# Patient Record
Sex: MALE | Race: WHITE | Employment: FULL TIME | URBAN - NONMETROPOLITAN AREA
[De-identification: names, ages, dates, MRNs, and addresses within clinical notes are randomized per-mention and may not be internally consistent; named-entity substitution may affect disease eponyms.]

---

## 2024-06-13 ENCOUNTER — OFFICE VISIT (OUTPATIENT)
Dept: FAMILY MEDICINE CLINIC | Age: 64
End: 2024-06-13

## 2024-06-13 VITALS
WEIGHT: 291 LBS | HEART RATE: 65 BPM | RESPIRATION RATE: 16 BRPM | TEMPERATURE: 97.2 F | DIASTOLIC BLOOD PRESSURE: 80 MMHG | OXYGEN SATURATION: 95 % | SYSTOLIC BLOOD PRESSURE: 130 MMHG

## 2024-06-13 DIAGNOSIS — S61.210A LACERATION OF RIGHT INDEX FINGER WITHOUT FOREIGN BODY WITHOUT DAMAGE TO NAIL, INITIAL ENCOUNTER: Primary | ICD-10-CM

## 2024-06-13 RX ORDER — HYDRALAZINE HYDROCHLORIDE 25 MG/1
1 TABLET, FILM COATED ORAL 2 TIMES DAILY
COMMUNITY
Start: 2024-05-14

## 2024-06-13 RX ORDER — POTASSIUM CITRATE 15 MEQ/1
15 TABLET, EXTENDED RELEASE ORAL
COMMUNITY
Start: 2023-02-06

## 2024-06-13 RX ORDER — GLYBURIDE 5 MG/1
1 TABLET ORAL 2 TIMES DAILY
COMMUNITY
Start: 2024-03-08

## 2024-06-13 RX ORDER — LOSARTAN POTASSIUM 50 MG/1
50 TABLET ORAL DAILY
COMMUNITY
Start: 2024-04-29

## 2024-06-13 RX ORDER — ATENOLOL 50 MG/1
50 TABLET ORAL DAILY
COMMUNITY
Start: 2024-04-19

## 2024-06-13 RX ORDER — LIDOCAINE 50 MG/G
1 PATCH TOPICAL DAILY
COMMUNITY
Start: 2023-12-01

## 2024-06-13 RX ORDER — LANCETS 30 GAUGE
EACH MISCELLANEOUS
COMMUNITY
Start: 2024-03-26

## 2024-06-13 RX ORDER — ALLOPURINOL 300 MG/1
300 TABLET ORAL DAILY
COMMUNITY
Start: 2023-05-08

## 2024-06-13 RX ORDER — TIRZEPATIDE 2.5 MG/.5ML
2.5 INJECTION, SOLUTION SUBCUTANEOUS
COMMUNITY
Start: 2024-03-25

## 2024-06-13 RX ORDER — HYDROCHLOROTHIAZIDE 12.5 MG/1
12.5 CAPSULE, GELATIN COATED ORAL DAILY
COMMUNITY
Start: 2024-04-22

## 2024-06-13 RX ORDER — ALLOPURINOL 100 MG/1
100 TABLET ORAL DAILY
COMMUNITY
Start: 2024-04-22

## 2024-06-13 RX ORDER — BACLOFEN 10 MG/1
10 TABLET ORAL EVERY 8 HOURS PRN
COMMUNITY
Start: 2023-12-01

## 2024-06-13 NOTE — PROGRESS NOTES
Subjective:      Madan Cabezas is a 63 y.o. male who presents for evaluation of a laceration to the  right index finger . Injury occurred 2 hour ago. The mechanism of the wound was razor. The patient reports no coldness, no numbness, no paresthesias in the affected area. There was not other injuries.  Patient denies head injury, loss of consciousness, neck pain, chest pain, abdominal pain, extremity injury, numbness, and weakness. The tetanus status is a year ago secondary to leg injury.  Patient's medications, allergies, past medical, surgical, social and family histories were reviewed and updated as appropriate.    Review of Systems  Pertinent items are noted in HPI.      Objective:      /80 (Site: Left Upper Arm, Position: Sitting, Cuff Size: Large Adult)   Pulse 65   Temp 97.2 °F (36.2 °C) (Temporal)   Resp 16   Wt 132 kg (291 lb)   SpO2 95%     There is a v-shaped laceration measuring approximately 1.5 cm in length on the right medial index finger. Examination of the wound for foreign bodies and devitalized tissue showed none.  Examination of the surrounding area for neural or vascular damage showed no concerns.  Cap refill intact, neurovascularly intact.      Consent was obtained prior to laceration repair.     Assessment:      1.5 cm  right index finger  laceration      Plan:      The wound area was wiped with isopropyl alcohol and draped in a sterile fashion.  The wound area was anesthetized with Lidocaine 1% without epinephrine without added sodium bicarbonate.  The wound was explored with the following results No foreign bodies found.  The wound was repaired with 3-0 ethilon; 2 simple interrupted sutures were used.  The wound was dressed and antibiotic ointment was applied.   Plan to follow-up in 7 to 10 days for suture removal.    Emanuel Rodriguez DO